# Patient Record
Sex: FEMALE | Race: WHITE | NOT HISPANIC OR LATINO | Employment: OTHER | URBAN - METROPOLITAN AREA
[De-identification: names, ages, dates, MRNs, and addresses within clinical notes are randomized per-mention and may not be internally consistent; named-entity substitution may affect disease eponyms.]

---

## 2023-08-29 ENCOUNTER — OFFICE VISIT (OUTPATIENT)
Dept: FAMILY MEDICINE | Facility: CLINIC | Age: 28
End: 2023-08-29
Payer: COMMERCIAL

## 2023-08-29 VITALS
HEART RATE: 81 BPM | TEMPERATURE: 98.2 F | SYSTOLIC BLOOD PRESSURE: 104 MMHG | OXYGEN SATURATION: 98 % | BODY MASS INDEX: 22.11 KG/M2 | DIASTOLIC BLOOD PRESSURE: 72 MMHG | HEIGHT: 69 IN | WEIGHT: 149.3 LBS | RESPIRATION RATE: 18 BRPM

## 2023-08-29 DIAGNOSIS — R09.81 CONGESTION OF PARANASAL SINUS: Primary | ICD-10-CM

## 2023-08-29 ASSESSMENT — PAIN SCALES - GENERAL: PAINLEVEL: NO PAIN (0)

## 2023-08-29 NOTE — PROGRESS NOTES
"       HPI       Alessia Kline is a 28 year old  who presents for   Chief Complaint   Patient presents with    Sinus Problem     Has been exposed to a multitude of people with similar symptoms and they were treated with a zpack  Has congestion and runny nose, swollen lymph nodes     28 year-old female presents with sinus complaints: head and nasal  Congestion, runny nose, headache past 3-4 days. Rhinorrhea mostly clear. Facial pain. No history of Sinusitis. SO and his teammates have similar symptoms.  Denies fever, chills. Denies cough or post nasal drainage. No history of seasonal allergies but new to area.    Problem, Medication and Allergy Lists were reviewed and updated if needed..    Patient is a new patient to this clinic and so  I reviewed/updated the Past Medical History, the Family History and the Social History .         Review of Systems:   Review of Systems  GEN: negative fever, chills.   HEENT: as per HPI. No sore throat. Does not want COVID test  RESP: negative cough, SOB, wheezing  CV: negative CP, irregular heart beat  DERM: negative rash       Physical Exam:     Vitals:    08/29/23 1051   BP: 104/72   Pulse: 81   Resp: 18   Temp: 98.2  F (36.8  C)   TempSrc: Oral   SpO2: 98%   Weight: 67.7 kg (149 lb 4.8 oz)   Height: 1.74 m (5' 8.5\")     Body mass index is 22.37 kg/m .  Vitals were reviewed and were normal     GEN: alert female in NAD  HEENT: Eyes clear, AXEL.  Ears: TMs gray with diminished LR. Nose: scant clear nasal discharge, mild erythema and edema of turbinates. Mild tenderness to palpation over maxillary sinuses.  OP: scant post nasal drainage, mild erythema posterior pharynx, no tonsillar enlargement, erythema or exudate.   LYMPH: negative  NECK supp;le  LUNGS: CTA with air entry throughout.   CV: HRRR, S1, s2, no MRG  SKIN: no rash        Results:   No testing ordered today    Assessment and Plan      1. Congestion of paranasal sinus  Upper respiratory infection likely viral. OTC " medications to treat symptoms. Reviewed use and side effects. Usual trajectory of URI reviewed. Maintain hydration. Seek care if worsening symptoms, persistent symptoms or bimodal pattern. Offered COVID test, patient declined. Rest.   May try Netti pot or steaming.          There are no discontinued medications.    Options for treatment and follow-up care were reviewed with the patient. Alessia Kline  engaged in the decision making process and verbalized understanding of the options discussed and agreed with the final plan.    ROMARIO Crain CNP

## 2023-08-29 NOTE — PATIENT INSTRUCTIONS
Upper respiratory infection, likely viral  Try over the counter: Mucinex (guaifenesin) to break up secretions.   May use decongestant like Sudafed for a few days.   Netti pot to flush sinuses or steaming may help relieve symptoms  Over the counter acetaminophen or iburprofen as directed.   Offered COVID test, declined

## 2023-08-29 NOTE — NURSING NOTE
"ROOM:2  [facility]    Preferred Name: Alessia     How did you hear about us?  Internet / Google Search    28 year old  Chief Complaint   Patient presents with     Sinus Problem     Has been exposed to a multitude of people with similar symptoms and they were treated with a zpack  Has congestion and runny nose, swollen lymph nodes       Blood pressure 104/72, pulse 81, temperature 98.2  F (36.8  C), temperature source Oral, resp. rate 18, height 1.74 m (5' 8.5\"), weight 67.7 kg (149 lb 4.8 oz), SpO2 98 %. Body mass index is 22.37 kg/m .  BP completed using cuff size:        There is no problem list on file for this patient.      Wt Readings from Last 2 Encounters:   08/29/23 67.7 kg (149 lb 4.8 oz)     BP Readings from Last 3 Encounters:   08/29/23 104/72       No Known Allergies    No current outpatient medications on file.     No current facility-administered medications for this visit.       Social History     Tobacco Use     Smoking status: Never     Smokeless tobacco: Never   Vaping Use     Vaping Use: Never used   Substance Use Topics     Alcohol use: Yes     Drug use: Yes     Types: Marijuana       Honoring Choices - Health Care Directive Guide offered to patient at time of visit.    There are no preventive care reminders to display for this patient.      There is no immunization history on file for this patient.    No results found for: PAP    No lab results found.        8/29/2023    10:51 AM   PHQ-2 ( 1999 [employer])   Q1: Little interest or pleasure in doing things 0   Q2: Feeling down, depressed or hopeless 0   PHQ-2 Score 0            No data to display                     No data to display                     No data to display                Venecia Esposito    August 29, 2023 10:53 AM    "